# Patient Record
Sex: MALE | Race: AMERICAN INDIAN OR ALASKA NATIVE
[De-identification: names, ages, dates, MRNs, and addresses within clinical notes are randomized per-mention and may not be internally consistent; named-entity substitution may affect disease eponyms.]

---

## 2020-01-12 ENCOUNTER — HOSPITAL ENCOUNTER (OUTPATIENT)
Dept: HOSPITAL 5 - ED | Age: 25
Setting detail: OBSERVATION
LOS: 1 days | Discharge: HOME HEALTH | End: 2020-01-13
Attending: INTERNAL MEDICINE | Admitting: INTERNAL MEDICINE
Payer: COMMERCIAL

## 2020-01-12 DIAGNOSIS — R55: ICD-10-CM

## 2020-01-12 DIAGNOSIS — G40.909: ICD-10-CM

## 2020-01-12 DIAGNOSIS — I10: ICD-10-CM

## 2020-01-12 DIAGNOSIS — G43.909: ICD-10-CM

## 2020-01-12 DIAGNOSIS — I63.9: Primary | ICD-10-CM

## 2020-01-12 DIAGNOSIS — E66.01: ICD-10-CM

## 2020-01-12 DIAGNOSIS — R29.818: ICD-10-CM

## 2020-01-12 LAB
ALBUMIN SERPL-MCNC: 3.6 G/DL (ref 3.9–5)
ALT SERPL-CCNC: 30 UNITS/L (ref 7–56)
APTT BLD: 29.1 SEC. (ref 24.2–36.6)
BASOPHILS # (AUTO): 0.1 K/MM3 (ref 0–0.1)
BASOPHILS NFR BLD AUTO: 1.1 % (ref 0–1.8)
BENZODIAZEPINES SCREEN,URINE: (no result)
BILIRUB DIRECT SERPL-MCNC: < 0.2 MG/DL (ref 0–0.2)
BILIRUB UR QL STRIP: (no result)
BLOOD UR QL VISUAL: (no result)
BUN SERPL-MCNC: 9 MG/DL (ref 9–20)
BUN/CREAT SERPL: 11 %
CALCIUM SERPL-MCNC: 9.5 MG/DL (ref 8.4–10.2)
EOSINOPHIL # BLD AUTO: 0.1 K/MM3 (ref 0–0.4)
EOSINOPHIL NFR BLD AUTO: 1.1 % (ref 0–4.3)
HCT VFR BLD CALC: 42.4 % (ref 35.5–45.6)
HEMOLYSIS INDEX: 12
HGB BLD-MCNC: 14.5 GM/DL (ref 11.8–15.2)
INR PPP: 1.03 (ref 0.87–1.13)
LYMPHOCYTES # BLD AUTO: 1.6 K/MM3 (ref 1.2–5.4)
LYMPHOCYTES NFR BLD AUTO: 15.5 % (ref 13.4–35)
MCHC RBC AUTO-ENTMCNC: 34 % (ref 32–34)
MCV RBC AUTO: 82 FL (ref 84–94)
METHADONE SCREEN,URINE: (no result)
MONOCYTES # (AUTO): 0.9 K/MM3 (ref 0–0.8)
MONOCYTES % (AUTO): 8.4 % (ref 0–7.3)
MUCOUS THREADS #/AREA URNS HPF: (no result) /HPF
OPIATE SCREEN,URINE: (no result)
PH UR STRIP: 8 [PH] (ref 5–7)
PLATELET # BLD: 357 K/MM3 (ref 140–440)
PROT UR STRIP-MCNC: (no result) MG/DL
RBC # BLD AUTO: 5.2 M/MM3 (ref 3.65–5.03)
RBC #/AREA URNS HPF: 0 /HPF (ref 0–6)
UROBILINOGEN UR-MCNC: < 2 MG/DL (ref ?–2)
WBC #/AREA URNS HPF: 1 /HPF (ref 0–6)

## 2020-01-12 PROCEDURE — 85670 THROMBIN TIME PLASMA: CPT

## 2020-01-12 PROCEDURE — 70450 CT HEAD/BRAIN W/O DYE: CPT

## 2020-01-12 PROCEDURE — A9540 TC99M MAA: HCPCS

## 2020-01-12 PROCEDURE — 93880 EXTRACRANIAL BILAT STUDY: CPT

## 2020-01-12 PROCEDURE — 85610 PROTHROMBIN TIME: CPT

## 2020-01-12 PROCEDURE — A9558 XE133 XENON 10MCI: HCPCS

## 2020-01-12 PROCEDURE — 80307 DRUG TEST PRSMV CHEM ANLYZR: CPT

## 2020-01-12 PROCEDURE — 36415 COLL VENOUS BLD VENIPUNCTURE: CPT

## 2020-01-12 PROCEDURE — G0378 HOSPITAL OBSERVATION PER HR: HCPCS

## 2020-01-12 PROCEDURE — 85730 THROMBOPLASTIN TIME PARTIAL: CPT

## 2020-01-12 PROCEDURE — 78582 LUNG VENTILAT&PERFUS IMAGING: CPT

## 2020-01-12 PROCEDURE — 83880 ASSAY OF NATRIURETIC PEPTIDE: CPT

## 2020-01-12 PROCEDURE — 93306 TTE W/DOPPLER COMPLETE: CPT

## 2020-01-12 PROCEDURE — 85025 COMPLETE CBC W/AUTO DIFF WBC: CPT

## 2020-01-12 PROCEDURE — 82550 ASSAY OF CK (CPK): CPT

## 2020-01-12 PROCEDURE — 80076 HEPATIC FUNCTION PANEL: CPT

## 2020-01-12 PROCEDURE — 80048 BASIC METABOLIC PNL TOTAL CA: CPT

## 2020-01-12 PROCEDURE — 85379 FIBRIN DEGRADATION QUANT: CPT

## 2020-01-12 PROCEDURE — 93005 ELECTROCARDIOGRAM TRACING: CPT

## 2020-01-12 PROCEDURE — 96372 THER/PROPH/DIAG INJ SC/IM: CPT

## 2020-01-12 PROCEDURE — 93010 ELECTROCARDIOGRAM REPORT: CPT

## 2020-01-12 PROCEDURE — 84484 ASSAY OF TROPONIN QUANT: CPT

## 2020-01-12 PROCEDURE — 71045 X-RAY EXAM CHEST 1 VIEW: CPT

## 2020-01-12 PROCEDURE — 81001 URINALYSIS AUTO W/SCOPE: CPT

## 2020-01-12 PROCEDURE — 96374 THER/PROPH/DIAG INJ IV PUSH: CPT

## 2020-01-12 PROCEDURE — 82962 GLUCOSE BLOOD TEST: CPT

## 2020-01-12 PROCEDURE — 80061 LIPID PANEL: CPT

## 2020-01-12 PROCEDURE — 97162 PT EVAL MOD COMPLEX 30 MIN: CPT

## 2020-01-12 PROCEDURE — 97110 THERAPEUTIC EXERCISES: CPT

## 2020-01-12 PROCEDURE — 97530 THERAPEUTIC ACTIVITIES: CPT

## 2020-01-12 PROCEDURE — 99291 CRITICAL CARE FIRST HOUR: CPT

## 2020-01-12 PROCEDURE — 82553 CREATINE MB FRACTION: CPT

## 2020-01-12 RX ADMIN — ASPIRIN SCH: 325 TABLET ORAL at 15:42

## 2020-01-12 RX ADMIN — HEPARIN SODIUM SCH UNIT: 5000 INJECTION, SOLUTION INTRAVENOUS; SUBCUTANEOUS at 22:04

## 2020-01-12 NOTE — PROGRESS NOTE
Subjective


Date of service: 01/12/20


Interval history: 


seen for stroke alert  23 yo BM seen for AMS and speech slurring  seen in CT and

checjked the CT which was grossly normal advise the RN fro ED of CT   remainder 

of the w/u pending  no family here to speak to   will follow up CT hard too 

assess because the CT table would not move since patient's weight exheeded limit

of table  but I am satisfied rthat images reviewed show no bleed or acute 

perocess








Objective





- Vital Sign


                               Vital Signs - 12hr











  01/12/20 01/12/20





  11:59 13:14


 


Temperature 97.6 F 


 


Pulse Rate 96 H 74


 


Respiratory 18 18





Rate  


 


Blood Pressure 174/105 


 


Blood Pressure  140/80





[Left]  


 


O2 Sat by Pulse 99 100





Oximetry

## 2020-01-12 NOTE — PROGRESS NOTE
Subjective


Date of service: 01/12/20


Interval history: 


see dictated note from stroke alert








Objective





- Vital Sign


                               Vital Signs - 12hr











  01/12/20 01/12/20 01/12/20





  11:59 13:00 13:14


 


Temperature 97.6 F  


 


Pulse Rate 96 H  74


 


Respiratory 18  18





Rate   


 


Blood Pressure 174/105  


 


Blood Pressure   140/80





[Left]   


 


O2 Sat by Pulse 99 100 100





Oximetry   














  01/12/20 01/12/20 01/12/20





  14:18 16:26 17:18


 


Temperature   


 


Pulse Rate 74 74 90


 


Respiratory 22 20 





Rate   


 


Blood Pressure   169/104


 


Blood Pressure 168/98 176/113 





[Left]   


 


O2 Sat by Pulse 100 100 





Oximetry   














- Laboratory Findings


CBC and BMP: 


                                 01/12/20 13:34





                                 01/12/20 13:34


Abnormal Lab Findings: 


                                  Abnormal Labs











  01/12/20 01/12/20 01/12/20





  13:34 13:34 13:34


 


RBC  5.20 H  


 


MCV  82 L  


 


Mono % (Auto)  8.4 H  


 


Mono #  0.9 H  


 


Seg Neutrophils %  73.9 H  


 


D-Dimer   347.84 H 


 


Sodium   


 


Carbon Dioxide   


 


Total Creatine Kinase    366 H


 


Albumin   


 


Urine pH   














  01/12/20 01/12/20





  13:34 13:40


 


RBC  


 


MCV  


 


Mono % (Auto)  


 


Mono #  


 


Seg Neutrophils %  


 


D-Dimer  


 


Sodium  136 L 


 


Carbon Dioxide  21 L 


 


Total Creatine Kinase  


 


Albumin  3.6 L 


 


Urine pH   8.0 H

## 2020-01-12 NOTE — XRAY REPORT
CHEST 1 VIEW 1/12/2020 12:47 PM



INDICATION / CLINICAL INFORMATION:

HTN.



COMPARISON: 

None available.



FINDINGS:



SUPPORT DEVICES: None.



HEART / MEDIASTINUM: No significant abnormality. 



LUNGS / PLEURA: No significant pulmonary or pleural abnormality. No pneumothorax. 



ADDITIONAL FINDINGS: No significant additional findings.



IMPRESSION:

1. No acute findings.



Signer Name: Saravanan Garsia MD 

Signed: 1/12/2020 1:02 PM

 Workstation Name: [id number]

## 2020-01-12 NOTE — HISTORY AND PHYSICAL REPORT
History of Present Illness


Chief complaint: 





I do not know was going on


History of present illness: 


24-year-old male with morbid obesity, lymphedema, migraine headache, seizure 

disorder presents to ED for evaluation.  Patient is minimally cooperative with 

interview at the time of my exam.  Patient mother is at bedside during exam and 

interview and provides more expansive history.  As per the patient's motherthe 

patient has experienced left arm pain and weakness after a left arm injection 

for peripheral neuropathy as per the mother.  Patient mother reports the patient

experienced concomitant left leg weakness today while attending [employer].  Patient

reports that he does not recall any of the events that charge.  Patient mother 

reports that patient "went out" for a few minutes.  Several of the charge nurses

subsequently came over to evaluate the patient and conducted a sternal rub which

caused the patient to become more alert.  EMS was notified upon arrival the 

patient was found to be in distress.  A code stroke was called and the patient 

was [facility]  Patient seen and 

evaluated in the ED.  Tele-neurology consulted in ED.  Neurology consulted in 

ED.  Patient found not to be a candidate for TPA.  Lab and imaging studies 

reviewed.  Patient placed in observation status and initiated on stroke 

protocol.  No reports of fever, chills, chest pain, palpitations, shortness of 

breath, trauma, productive cough, skin rash, or recent ill contacts.  Prior 

admission on 4/14/2016 reviewed all medication listed at time of admission have 

been reconciled.











Past History


Past Medical History: migraines, seizures, other (See HPI)


Past Surgical History: No surgical history, Other (Reviewed)


Social history: single.  denies: smoking, alcohol abuse, prescription drug abuse


Family history: diabetes, hypertension





Medications and Allergies


                                    Allergies











Allergy/AdvReac Type Severity Reaction Status Date / Time


 


No Known Allergies Allergy   Unverified 04/14/16 12:34











                                Home Medications











 Medication  Instructions  Recorded  Confirmed  Last Taken  Type


 


No Known Home Medications [No  01/12/20 01/12/20 Unknown History





Reported Home Medications]     














Review of Systems


Constitutional: no weight loss, no weight gain, no fever, no chills


Ears, nose, mouth and throat: no ear pain, no ear discharge, no decreased hearin

g, no nose pain, no nasal congestion, no nasal discharge


Cardiovascular: no chest pain, no orthopnea, no palpitations, no rapid/irregular

heart beat, no lightheadedness, no shortness of breath


Respiratory: no cough, no cough with sputum, no excessive sputum, no hemoptysis,

no shortness of breath


Gastrointestinal: no abdominal pain, no nausea, no diarrhea, no constipation, no

hematemesis


Genitourinary Male: no hematuria, no discharge, no urinary frequency, no urinary

hesitancy


Rectal: no pain, no incontinence, no bleeding


Musculoskeletal: no neck pain, no shooting arm pain, no arm numbness/tingling, 

no shooting leg pain


Integumentary: no rash, no pruritis, no blisters


Neurological: weakness, confusion, memory loss, no tingling, no syncope, no 

change in speech, no change in mentation


Psychiatric: no anxiety, no memory loss, no insomnia, no change in libido


Endocrine: no heat intolerance, no polyphagia, no polydipsia, no polyuria


Hematologic/Lymphatic: no easy bruising, no easy bleeding, no lymphadenopathy


Allergic/Immunologic: no allergic rhinitis, no wheezing, no persistent 

infections, no anaphylaxis





Exam





- Constitutional


Vitals: 


                                        











Temp Pulse Resp BP Pulse Ox


 


 97.6 F   74   18   140/80   100 


 


 01/12/20 11:59  01/12/20 13:14  01/12/20 13:14  01/12/20 13:14  01/12/20 13:14











General appearance: Present: no acute distress, obese





- EENT


Eyes: Present: PERRL


ENT: hearing intact, clear oral mucosa





- Neck


Neck: Present: supple, normal ROM





- Respiratory


Respiratory effort: normal


Respiratory: bilateral: CTA





- Cardiovascular


Heart Sounds: Present: S1 & S2.  Absent: rub, click





- Extremities


Extremities: pulses symmetrical


Extremity abnormal: edema, other


Peripheral Pulses: within normal limits





- Abdominal


General gastrointestinal: Present: soft, non-tender, non-distended, normal bowel

sounds


Male genitourinary: Present: normal





- Integumentary


Integumentary: Present: clear, warm, dry





- Musculoskeletal


Musculoskeletal: gait normal, strength equal bilaterally





- Psychiatric


Psychiatric: appropriate mood/affect, intact judgment & insight





- Neurologic


Neurologic: CNII-XII intact, moves all extremities





Results





- Labs


CBC & Chem 7: 


                                 01/12/20 13:34





                                 01/12/20 13:34





Assessment and Plan





- Patient Problems


(1) CVA (cerebral vascular accident)


Current Visit: Yes   Status: Acute   


Qualifiers: 


   Laterality of affected vessel: unspecified 


Plan to address problem: 


CVA protocol, CT head, neurochecks, neurology consulted in ED, tele-neurology co

nsulted, lipid panel, antiplatelet therapy, supportive care, neurochecks,








(2) Morbid obesity


Current Visit: Yes   Status: Chronic   


Plan to address problem: 


Balanced diet, increase physical activity at discharge, outpatient bariatric 

surgery consult.








(3) Seizure disorder


Current Visit: Yes   Status: Acute   


Plan to address problem: 


No active seizure at time of admission, neuro check, supportive care.  Neurology

consulted.








(4) DVT prophylaxis


Current Visit: Yes   Status: Acute   


Plan to address problem: 


SCD to bilateral lower extremities while in bed, prophylactic heparin

## 2020-01-12 NOTE — EMERGENCY DEPARTMENT REPORT
ED Neuro Deficit HPI





- General


Chief Complaint: Neuro Symptoms/Deficit


Stated Complaint: SYNCOPAL EPISODE


Time Seen by Provider: 01/12/20 12:10


Source: patient, EMS


Mode of arrival: Stretcher


Limitations: Physical Limitation





- History of Present Illness


Initial Comments: 


This is a 24-year-old morbidly obese man who has presented to this hospital 2016

with similar symptoms.  He is here with his family.  They were at [facility].  He 

apparently suffered a syncopal episode while he was sitting in a chair.  He 

remained in the chair with assistance but not responding to others for 3-4 

minutes.  It is reported that his breathing was normal while he had the above 

episode.  There was no shaking movements.  Last known well time was 11:30.  He 

does have a history of seizure on Keppra.  He was diagnosed as having complex 

migraine versus conversion disorder for similar symptoms in 2016:








Hospital course: 


Patient is a 21 years old morbidly obese -American male with history of 

childhood seizures seizure disorder who presented to the hospital complaining of

severe headache associated with left-sided weakness.  He had a CT head on admiss

ion with no acute findings.  Brain MRI/A unable to be obtained due to patient's 

weight.  After 4 days another CT head was obtained and CVA was ruled out.  

Differential diagnosis complex migraine versus conversion disorder.  Patient was

started on migraine medications and his discharged with home PT.  Advised to 

follow-up also with psychiatry.








Disposition: DC/TX HOME UNDER HOME HEALTH


Time spent for discharge: 35 min





- Discharge Diagnoses


(1) Headache


Status: Acute   Qualifiers: 


     Headache type: unspecified     Headache chronicity pattern: acute headache 

   Intractability: intractable     Qualifier Code: (R51) Headache  





(2) Neurosensory deficit


Status: Acute





(3) Seizure disorder


Status: Acute





(4) Morbid obesity


Status: Chronic   





Patient states that he does not recall the episode.  I spoke to the tele-

neurologist and ready.  He told the tele-neurologist that he doesn't remember 

anything about 2016.  In fact, he does not recall being transported to the 

emergency department per conversation with the tele-neurologist.  He was 

speaking in a very low voice.  He kept his eyes closed during my encounter 

mostly.  He had apparently poor effort on NIH stroke score testing.  He was 

found to have a left hemiparesis.





Patient stated that he had chest pain since yesterday which he describes as dull

and intermittent.  He states that he had a headache since "1 minute before"I 

came in the room.  The headache was essentially global.





Patient's mother thinks this episode is related to a nerve block that he had 

near his left elbow for pain in his left hand or wrist.  I do not know if she's 

been diagnosed with RSD or some sort of peripheral neuropathy.  She also states 

that he may have some swelling in his left axillary area.  She was relatively 

unconvinced that this episode is not likely related as the patient has now left 

leg weakness as well.  She is also resisting the idea that this may be a similar

episode compared with 2016.  Reviewing the medical records, it does look 

remarkably similar.





I have now discussed the case with the tele-neurologist twice.  She recommends 

aspirin and admission for serial CT.  She does not recommend TPA.  Patient has a

prior similar episode, current headache, chest pain and an alternate diagnosis 

which is much more likely than stroke.  Therefore, TPA is not indicated.


-: Sudden


Location: left arm, left leg


Presenting Symptoms: Present: Weak/Paralyzed One Side


History of same: Yes


Place: other ([facility])


Severity: severe


Quality: weak


Improves With: none


Worsens With: none


On Anticoagulants: No


Context: sudden onset


Associated Symptoms: chest pain





- Related Data


Home Medications: 


                                  Previous Rx's











 Medication  Instructions  Recorded  Last Taken  Type


 


Butalb/Acetamin/Caff -40 2 tab PO Q4H PRN #30 tablet 04/19/16 Unknown Rx





[Fioricet -40]    


 


levETIRAcetam [Keppra TAB] 500 mg PO BID #60 tablet 04/19/16 Unknown Rx











Allergies/Adverse Reactions: 


                                    Allergies











Allergy/AdvReac Type Severity Reaction Status Date / Time


 


No Known Allergies Allergy   Unverified 04/14/16 12:34














ED Review of Systems


ROS: 


Stated complaint: SYNCOPAL EPISODE


Other details as noted in HPI





Constitutional: denies: chills, fever


Eyes: denies: eye pain, eye discharge, vision change


ENT: denies: ear pain, throat pain


Respiratory: denies: cough, shortness of breath, wheezing


Cardiovascular: chest pain.  denies: palpitations


Endocrine: no symptoms reported


Gastrointestinal: denies: abdominal pain, nausea, diarrhea


Genitourinary: denies: urgency, dysuria


Musculoskeletal: denies: back pain, joint swelling, arthralgia


Skin: denies: rash, lesions


Neurological: headache.  denies: weakness, paresthesias


Psychiatric: denies: anxiety, depression


Hematological/Lymphatic: denies: easy bleeding, easy bruising





ED Past Medical Hx





- Past Medical History


Hx Headaches / Migraines: Yes


Hx Seizures: Yes (couple years ago)





- Social History


Smoking Status: Never Smoker


Substance Use Type: None





- Medications


Home Medications: 


                                Home Medications











 Medication  Instructions  Recorded  Confirmed  Last Taken  Type


 


Butalb/Acetamin/Caff -40 2 tab PO Q4H PRN #30 tablet 04/19/16  Unknown Rx





[Fioricet -40]     


 


levETIRAcetam [Keppra TAB] 500 mg PO BID #60 tablet 04/19/16  Unknown Rx














ED Neuro Physical Exam





- General


Limitations: Physical Limitation


General appearance: lethargic (keeping eyes closed), obese (morbidly)


Suspected Stroke: Yes (very unlikely)





- Head


Head exam: Present: atraumatic, normocephalic





- Eye


Eye exam: Present: normal appearance, PERRL, EOMI.  Absent: scleral icterus





- ENT


ENT exam: Present: mucous membranes moist





- Neck


Neck exam: Present: normal inspection.  Absent: tenderness, meningismus





- Respiratory


Respiratory exam: Present: normal lung sounds bilaterally.  Absent: respiratory 

distress





- Cardiovascular


Cardiovascular Exam: Present: regular rate, normal rhythm.  Absent: systolic 

murmur, diastolic murmur, rubs, gallop





- GI/Abdominal


GI/Abdominal exam: Present: soft, normal bowel sounds.  Absent: distended, 

tenderness, guarding, rebound





- Rectal


Rectal exam: Present: deferred





- Extremities Exam


Extremities exam: Present: normal inspection





- Back Exam


Back exam: Present: normal inspection





- Neurological Exam


Neurological exam: Present: alert, oriented X3, CN II-XII intact, motor sensory 

deficit





- NIHSS


Assessment Interval: Baseline


1a. Level of Consciousness: arousable/minor stimuli


1b. LOC Questions: answers both correctly


1c. LOC Commands: performs tasks correctly


2. Best Gaze: normal


3. Visual: no visual loss


4. Facial Palsy: normal symmetrical movement


5b. Motor Arm Right: no drift


5a. Motor Arm Left: no gravity effort


6a. Motor Leg Left: no gravity effort


6b. Motor Leg Right: no drift


7. Limb Ataxia: absent


8. Sensory: normal


9. Best Language: no aphasia


10. Dysarthria: normal


11. Extinction/Inattention: no abnormality


Total Score: 7


Stroke Severity: Moderate Stroke





- Psychiatric


Psychiatric exam: Present: normal mood, flat affect





- Skin


Skin exam: Present: warm, dry, intact, normal color.  Absent: rash





ED Course


                                   Vital Signs











  01/12/20 01/12/20 01/12/20





  11:59 13:00 13:14


 


Temperature 97.6 F  


 


Pulse Rate 96 H  74


 


Respiratory 18  18





Rate   


 


Blood Pressure 174/105  


 


Blood Pressure   140/80





[Left]   


 


O2 Sat by Pulse 99 100 100





Oximetry   














- Reevaluation(s)


Reevaluation #1: 


Patient with slightly more movements of his upper and lower extremity.  There is

 seems to be an effort dependent component.  He will be admitted to the hospital

 for further care and evaluation.  He will be given aspirin as per the 

neurologist recommendation and admitted.


01/12/20 14:03








- Lab Data


Result diagrams: 


                                 01/12/20 13:34





                                 01/12/20 13:34


                                   Lab Results











  01/12/20 01/12/20 01/12/20 Range/Units





  13:34 13:34 13:34 


 


WBC  10.3    (4.5-11.0)  K/mm3


 


RBC  5.20 H    (3.65-5.03)  M/mm3


 


Hgb  14.5    (11.8-15.2)  gm/dl


 


Hct  42.4    (35.5-45.6)  %


 


MCV  82 L    (84-94)  fl


 


MCH  28    (28-32)  pg


 


MCHC  34    (32-34)  %


 


RDW  14.6    (13.2-15.2)  %


 


Plt Count  357    (140-440)  K/mm3


 


Lymph % (Auto)  15.5    (13.4-35.0)  %


 


Mono % (Auto)  8.4 H    (0.0-7.3)  %


 


Eos % (Auto)  1.1    (0.0-4.3)  %


 


Baso % (Auto)  1.1    (0.0-1.8)  %


 


Lymph #  1.6    (1.2-5.4)  K/mm3


 


Mono #  0.9 H    (0.0-0.8)  K/mm3


 


Eos #  0.1    (0.0-0.4)  K/mm3


 


Baso #  0.1    (0.0-0.1)  K/mm3


 


Seg Neutrophils %  73.9 H    (40.0-70.0)  %


 


Seg Neutrophils #  7.6    (1.8-7.7)  K/mm3


 


PT   13.6   (12.2-14.9)  Sec.


 


INR   1.03   (0.87-1.13)  


 


APTT   29.1   (24.2-36.6)  Sec.


 


Thrombin Time   16.2   (15.1-19.6)  Sec.


 


D-Dimer   347.84 H   (0-234)  ng/mlDDU


 


Total Bilirubin     (0.1-1.2)  mg/dL


 


Direct Bilirubin     (0-0.2)  mg/dL


 


AST     (5-40)  units/L


 


ALT     (7-56)  units/L


 


Alkaline Phosphatase     ()  units/L


 


Total Creatine Kinase    366 H  ()  units/L


 


CK-MB (CK-2)    2.7  (0.0-4.0)  ng/mL


 


CK-MB (CK-2) Rel Index    0.7  (0-4)  


 


Troponin T    < 0.010  (0.00-0.029)  ng/mL


 


NT-Pro-B Natriuret Pep     (0-450)  pg/mL


 


Total Protein     (6.3-8.2)  g/dL


 


Albumin     (3.9-5)  g/dL


 


Albumin/Globulin Ratio     %


 


Urine Color     (Yellow)  


 


Urine Turbidity     (Clear)  


 


Urine pH     (5.0-7.0)  


 


Ur Specific Gravity     (1.003-1.030)  


 


Urine Protein     (Negative)  mg/dL


 


Urine Glucose (UA)     (Negative)  mg/dL


 


Urine Ketones     (Negative)  mg/dL


 


Urine Blood     (Negative)  


 


Urine Nitrite     (Negative)  


 


Urine Bilirubin     (Negative)  


 


Urine Urobilinogen     (<2.0)  mg/dL


 


Ur Leukocyte Esterase     (Negative)  


 


Urine WBC (Auto)     (0.0-6.0)  /HPF


 


Urine RBC (Auto)     (0.0-6.0)  /HPF


 


U Epithel Cells (Auto)     (0-13.0)  /HPF


 


Urine Mucus     /HPF


 


Urine Opiates Screen     


 


Urine Methadone Screen     


 


Ur Barbiturates Screen     


 


Ur Phencyclidine Scrn     


 


Ur Amphetamines Screen     


 


U Benzodiazepines Scrn     


 


Urine Cocaine Screen     


 


U Marijuana (THC) Screen     


 


Drugs of Abuse Note     














  01/12/20 01/12/20 01/12/20 Range/Units





  13:34 13:40 13:40 


 


WBC     (4.5-11.0)  K/mm3


 


RBC     (3.65-5.03)  M/mm3


 


Hgb     (11.8-15.2)  gm/dl


 


Hct     (35.5-45.6)  %


 


MCV     (84-94)  fl


 


MCH     (28-32)  pg


 


MCHC     (32-34)  %


 


RDW     (13.2-15.2)  %


 


Plt Count     (140-440)  K/mm3


 


Lymph % (Auto)     (13.4-35.0)  %


 


Mono % (Auto)     (0.0-7.3)  %


 


Eos % (Auto)     (0.0-4.3)  %


 


Baso % (Auto)     (0.0-1.8)  %


 


Lymph #     (1.2-5.4)  K/mm3


 


Mono #     (0.0-0.8)  K/mm3


 


Eos #     (0.0-0.4)  K/mm3


 


Baso #     (0.0-0.1)  K/mm3


 


Seg Neutrophils %     (40.0-70.0)  %


 


Seg Neutrophils #     (1.8-7.7)  K/mm3


 


PT     (12.2-14.9)  Sec.


 


INR     (0.87-1.13)  


 


APTT     (24.2-36.6)  Sec.


 


Thrombin Time     (15.1-19.6)  Sec.


 


D-Dimer     (0-234)  ng/mlDDU


 


Total Bilirubin  0.50    (0.1-1.2)  mg/dL


 


Direct Bilirubin  < 0.2    (0-0.2)  mg/dL


 


AST  20    (5-40)  units/L


 


ALT  30    (7-56)  units/L


 


Alkaline Phosphatase  79    ()  units/L


 


Total Creatine Kinase     ()  units/L


 


CK-MB (CK-2)     (0.0-4.0)  ng/mL


 


CK-MB (CK-2) Rel Index     (0-4)  


 


Troponin T     (0.00-0.029)  ng/mL


 


NT-Pro-B Natriuret Pep  26.88    (0-450)  pg/mL


 


Total Protein  7.7    (6.3-8.2)  g/dL


 


Albumin  3.6 L    (3.9-5)  g/dL


 


Albumin/Globulin Ratio  0.9    %


 


Urine Color   Yellow   (Yellow)  


 


Urine Turbidity   Clear   (Clear)  


 


Urine pH   8.0 H   (5.0-7.0)  


 


Ur Specific Gravity   1.011   (1.003-1.030)  


 


Urine Protein   <15 mg/dl   (Negative)  mg/dL


 


Urine Glucose (UA)   Neg   (Negative)  mg/dL


 


Urine Ketones   Neg   (Negative)  mg/dL


 


Urine Blood   Neg   (Negative)  


 


Urine Nitrite   Neg   (Negative)  


 


Urine Bilirubin   Neg   (Negative)  


 


Urine Urobilinogen   < 2.0   (<2.0)  mg/dL


 


Ur Leukocyte Esterase   Neg   (Negative)  


 


Urine WBC (Auto)   1.0   (0.0-6.0)  /HPF


 


Urine RBC (Auto)   0.0   (0.0-6.0)  /HPF


 


U Epithel Cells (Auto)   < 1.0   (0-13.0)  /HPF


 


Urine Mucus   Few   /HPF


 


Urine Opiates Screen    Presumptive negative  


 


Urine Methadone Screen    Presumptive negative  


 


Ur Barbiturates Screen    Presumptive negative  


 


Ur Phencyclidine Scrn    Presumptive negative  


 


Ur Amphetamines Screen    Presumptive negative  


 


U Benzodiazepines Scrn    Presumptive negative  


 


Urine Cocaine Screen    Presumptive negative  


 


U Marijuana (THC) Screen    Presumptive negative  


 


Drugs of Abuse Note    Disclamer  











                         Laboratory Results - last 24 hr











  01/12/20 01/12/20 01/12/20





  13:34 13:34 13:34


 


WBC  10.3  


 


RBC  5.20 H  


 


Hgb  14.5  


 


Hct  42.4  


 


MCV  82 L  


 


MCH  28  


 


MCHC  34  


 


RDW  14.6  


 


Plt Count  357  


 


Lymph % (Auto)  15.5  


 


Mono % (Auto)  8.4 H  


 


Eos % (Auto)  1.1  


 


Baso % (Auto)  1.1  


 


Lymph #  1.6  


 


Mono #  0.9 H  


 


Eos #  0.1  


 


Baso #  0.1  


 


Seg Neutrophils %  73.9 H  


 


Seg Neutrophils #  7.6  


 


PT   13.6 


 


INR   1.03 


 


APTT   29.1 


 


Thrombin Time   16.2 


 


D-Dimer   347.84 H 


 


Total Bilirubin   


 


Direct Bilirubin   


 


AST   


 


ALT   


 


Alkaline Phosphatase   


 


Total Creatine Kinase    366 H


 


CK-MB (CK-2)    2.7


 


CK-MB (CK-2) Rel Index    0.7


 


Troponin T    < 0.010


 


NT-Pro-B Natriuret Pep   


 


Total Protein   


 


Albumin   


 


Albumin/Globulin Ratio   


 


Urine Color   


 


Urine Turbidity   


 


Urine pH   


 


Ur Specific Gravity   


 


Urine Protein   


 


Urine Glucose (UA)   


 


Urine Ketones   


 


Urine Blood   


 


Urine Nitrite   


 


Urine Bilirubin   


 


Urine Urobilinogen   


 


Ur Leukocyte Esterase   


 


Urine WBC (Auto)   


 


Urine RBC (Auto)   


 


U Epithel Cells (Auto)   


 


Urine Mucus   


 


Urine Opiates Screen   


 


Urine Methadone Screen   


 


Ur Barbiturates Screen   


 


Ur Phencyclidine Scrn   


 


Ur Amphetamines Screen   


 


U Benzodiazepines Scrn   


 


Urine Cocaine Screen   


 


U Marijuana (THC) Screen   


 


Drugs of Abuse Note   














  01/12/20 01/12/20 01/12/20





  13:34 13:40 13:40


 


WBC   


 


RBC   


 


Hgb   


 


Hct   


 


MCV   


 


MCH   


 


MCHC   


 


RDW   


 


Plt Count   


 


Lymph % (Auto)   


 


Mono % (Auto)   


 


Eos % (Auto)   


 


Baso % (Auto)   


 


Lymph #   


 


Mono #   


 


Eos #   


 


Baso #   


 


Seg Neutrophils %   


 


Seg Neutrophils #   


 


PT   


 


INR   


 


APTT   


 


Thrombin Time   


 


D-Dimer   


 


Total Bilirubin  0.50  


 


Direct Bilirubin  < 0.2  


 


AST  20  


 


ALT  30  


 


Alkaline Phosphatase  79  


 


Total Creatine Kinase   


 


CK-MB (CK-2)   


 


CK-MB (CK-2) Rel Index   


 


Troponin T   


 


NT-Pro-B Natriuret Pep  26.88  


 


Total Protein  7.7  


 


Albumin  3.6 L  


 


Albumin/Globulin Ratio  0.9  


 


Urine Color   Yellow 


 


Urine Turbidity   Clear 


 


Urine pH   8.0 H 


 


Ur Specific [place]   1.011 


 


Urine Protein   <15 mg/dl 


 


Urine Glucose (UA)   Neg 


 


Urine Ketones   Neg 


 


Urine Blood   Neg 


 


Urine Nitrite   Neg 


 


Urine Bilirubin   Neg 


 


Urine Urobilinogen   < 2.0 


 


Ur Leukocyte Esterase   Neg 


 


Urine WBC (Auto)   1.0 


 


Urine RBC (Auto)   0.0 


 


U Epithel Cells (Auto)   < 1.0 


 


Urine Mucus   Few 


 


Urine Opiates Screen    Presumptive negative


 


Urine Methadone Screen    Presumptive negative


 


Ur Barbiturates Screen    Presumptive negative


 


Ur Phencyclidine Scrn    Presumptive negative


 


Ur Amphetamines Screen    Presumptive negative


 


U Benzodiazepines Scrn    Presumptive negative


 


Urine Cocaine Screen    Presumptive negative


 


U Marijuana (THC) Screen    Presumptive negative


 


Drugs of Abuse Note    Disclamer














- EKG Data


-: EKG Interpreted by Me


EKG shows normal: sinus rhythm, axis, intervals, QRS complexes, ST-T waves


Interpretation: nonspecific ST-T wave surjit





- Radiology Data


Radiology results: image reviewed


CT head no abnormal findings, discussed with radiologist.  Chest x-ray normal 

mediastinum and no acute findings.


Critical Care Time: Yes


Critical care time in (mins) excluding proc time.: 45


Critical care attestation.: 


If time is entered above; I have spent that time in minutes in the direct care 

of this critically ill patient, excluding procedure time.








ED Disposition


Clinical Impression: 


 Focal neurological deficit, Morbid obesity, Seizure disorder





Headache


Qualifiers:


 Headache type: unspecified Headache chronicity pattern: acute headache 

Intractability: not intractable Qualified Code(s): R51 - Headache





Chest pain


Qualifiers:


 Chest pain type: unspecified Qualified Code(s): R07.9 - Chest pain, unspecified





Syncope


Qualifiers:


 Syncope type: unspecified Qualified Code(s): R55 - Syncope and collapse





Hypertension


Qualifiers:


 Hypertension type: essential hypertension Qualified Code(s): I10 - Essential 

(primary) hypertension





Disposition: DC-09 OP ADMIT IP TO THIS HOSP


Is pt being admited?: Yes


Does the pt Need Aspirin: Yes


Condition: Stable


Instructions:  Chest Pain (ED), Syncope (ED), Hypertension (ED)


Time of Disposition: 14:12

## 2020-01-12 NOTE — PROGRESS NOTE
Subjective


Date of service: 01/12/20


Interval history: 


radiologist and I agree as to CT reading  suspect he is too large to get MRI   

we shall see .








Objective





- Vital Sign


                               Vital Signs - 12hr











  01/12/20 01/12/20





  11:59 13:14


 


Temperature 97.6 F 


 


Pulse Rate 96 H 74


 


Respiratory 18 18





Rate  


 


Blood Pressure 174/105 


 


Blood Pressure  140/80





[Left]  


 


O2 Sat by Pulse 99 100





Oximetry

## 2020-01-12 NOTE — CAT SCAN REPORT
CT head/brain wo con



INDICATION / CLINICAL INFORMATION:

24 years Male; MAIN: CODE Stroke  RT SIDE WEAKNESS  ASPHASIC  PH#[id number]   [facility] PT  TABLE ISSUES 
ON FIRST SCAN .



TECHNIQUE: Routine CT head without contrast. All CT scans at this location are performed using CT dos
e reduction for ALARA by means of automated exposure control.



COMPARISON: 

4/18/2016



FINDINGS:



BRAIN / INTRACRANIAL CONTENTS: No acute hemorrhage, mass effect, midline shift, hydrocephalus, or acu
te, large territorial infarct. No chronic infarct or atrophy appreciated. No significant white matter
 abnormality.



CRANIOCERVICAL JUNCTION: No significant abnormality.



ORBITS: No significant abnormality of visualized orbits.

SINUSES / MASTOIDS: No significant abnormality the visualized paranasal sinuses or mastoid air cells.




ADDITIONAL FINDINGS: None. 



IMPRESSION:

1. No focal mass, hemorrhage, hydrocephalus, or acute, large territorial infarct.



This exam was performed as part of a code stroke protocol. The exam was completed on 1/12/2020 11:45 
AM. The exam was reviewed at 11:54 AM and Dr. Marrero was notified at 11:58 AM.



Signer Name: Cory Christie MD, III 

Signed: 1/12/2020 12:59 PM

 Workstation Name: [id number]

## 2020-01-12 NOTE — CONSULTATION
NEUROLOGY NOTE



HISTORY OF PRESENT ILLNESS:  This is a 24-year-old black male that presents to

the Emergency Room with new onset of right-sided weakness, difficulty with

speech.  He was found to be obtunded and very confused, was brought to the CT

scan room on a code stroke.  CT scan was done.  I assessed the results, which

were essentially unremarkable, although they were somewhat limited by the fact

that there is some movement type artifact and there is also difficulty with

moving there was no difficulty moving the table because of his weight being 370

and we had difficulty getting the parameters of the CT scan done, but I was

satisfied the CT scan did not show any acute lesions.  I examined the patient,

he was not speaking.  It was difficult to say he was aphasic or simply obtunded.

 He is a very large black male, weight about 380.  He moves all extremities

well, but had a great deal of difficulty moving from the table to the stretcher

and from the table back to the stretcher, but I did not demonstrate any focal

weakness.  His cranial nerves appear to be intact, although his speech was very

limited.  He has altered mental state.  I do not have much of a history since

family is not available.  There were no EMS to contact.  I spoke for a long

period with his nurse from the ED.  No additional history could be provided,

which was a relevant to the current assessment I am making.  The patient be

return back to the Emergency Room for completion of ED workup and further

assessment of labs.  At this point limited observations or that patient has an

encephalopathic state without evidence of any meaningful large lesion in the

brain, bleed, edema, stroke or any other evidence of craniocervical trauma.  I

also did not observe any seizures while the time the patient was in the CT scan

room.  I doubt that the patient is postictal.  I did not see that he had bitten

his tongue.  There was no blood appearing around his mouth or facial area, does

not have any facial abrasions or contusions that were suggestive cranial trauma.

 Recommend further workup.  At this point, the patient was returned to the ED

for more complete assessment and determination of the care algorithm.





DD: 01/12/2020 13:31

DT: 01/12/2020 19:48

JOB# [id number]  [id number]

MIREYA/AMINAH

## 2020-01-13 VITALS — DIASTOLIC BLOOD PRESSURE: 78 MMHG | SYSTOLIC BLOOD PRESSURE: 153 MMHG

## 2020-01-13 LAB — HDLC SERPL-MCNC: 35 MG/DL (ref 40–59)

## 2020-01-13 RX ADMIN — HEPARIN SODIUM SCH UNIT: 5000 INJECTION, SOLUTION INTRAVENOUS; SUBCUTANEOUS at 21:06

## 2020-01-13 RX ADMIN — HEPARIN SODIUM SCH UNIT: 5000 INJECTION, SOLUTION INTRAVENOUS; SUBCUTANEOUS at 11:42

## 2020-01-13 RX ADMIN — ASPIRIN SCH MG: 325 TABLET ORAL at 11:42

## 2020-01-13 NOTE — NUCLEAR MEDICINE REPORT
NUCLEAR MEDICINE VENTILATION/PERFUSION LUNG SCAN



INDICATION / CLINICAL INFORMATION:

dypsnea.



TECHNIQUE:

24.0 mCi of Xe-133 were given by inhalation. 

5.6 mCi of Tc-99m MAA were given by IV.



COMPARISON:

Chest radiograph dated 1/12/2020.



FINDINGS:

VENTILATION: No significant ventilation defects.



PERFUSION: No significant perfusion defects.



ADDITIONAL FINDINGS: None.



IMPRESSION:

 Low probability for pulmonary embolism.



Signer Name: Minh Owens Jr, MD 

Signed: 1/13/2020 11:04 AM

 Workstation Name: [id number]

## 2020-01-13 NOTE — VASCULAR LAB REPORT
BILATERAL CAROTID DOPPLER ULTRASOUND



INDICATION : stroke



TECHNIQUE:  Grayscale and color Doppler imaging performed through the neck.



COMPARISON:  None



FINDINGS:



Right:   There is no significant atherosclerotic disease. Peak systolic velocity in the CCA is 81 cm/
s with end-diastolic velocity of 20 cm/s. Peak systolic velocity in the proximal ICA is 70 cm/s with 
end-diastolic velocity of 32 cm/s. ICA to CCA ratio is less than 2. There is antegrade  flow in the E
CA and the vertebral artery. 



Left: There is no significant atherosclerotic disease. Peak systolic velocity in the CCA is 75 cm/s w
ith end-diastolic velocity of 16 cm/s. Peak systolic velocity in the proximal ICA is 68 cm/s with end
-diastolic velocity of 30 cm/s. ICA to CCA ratio is less than 2. There is antegrade  flow in the ECA 
and the vertebral artery. 



IMPRESSION: No hemodynamically significant stenosis by NASCET criteria.



Signer Name: Minh Owens Jr, MD 

Signed: 1/13/2020 1:12 PM

 Workstation Name: [id number]

## 2020-01-13 NOTE — DISCHARGE SUMMARY
Providers





- Providers


Date of Admission: 


01/12/20 13:32





Attending physician: 


SHAREE MENDEZ





                                        





01/12/20 13:32


Occupational Therapy Evaluate and Treat [CONS] Routine 


   Comment: 


   Reason For Exam: Neuro deficits


Physical Therapy Evaluation and Treat [CONS] Routine 


   Comment: 


   Reason For Exam: Neuro deficits





01/12/20 14:03


Consult to Physician [CONS] Routine 


   Comment: 


   Consulting Provider: LUCIE WHITE


   Physician Instructions: 


   Reason For Exam: cva











Primary care physician: 


[facility], [place]








Hospitalization


Condition: Stable





- Discharge Diagnoses


(1) CVA (cerebral vascular accident)


Status: Acute   


Qualifiers: 


   Laterality of affected vessel: unspecified 





(2) Morbid obesity


Status: Chronic   





(3) Seizure disorder


Status: Acute   





(4) DVT prophylaxis


Status: Acute   





Exam





- Constitutional


Vitals: 


                                        











Temp Pulse Resp BP Pulse Ox


 


 98.1 F   80   22   150/85   98 


 


 01/13/20 12:29  01/13/20 12:29  01/13/20 12:29  01/13/20 12:29  01/13/20 12:29














Plan


Weight Bearing Status: Weight Bear as Tolerated


Diet: low fat, low cholesterol, low salt


Special Instructions: record daily weights, record daily BP diary, physical 

therapy, home health RN


[facility] with: 


[facility] [[facility] Care Provider[facility] - 7 Days


Prescriptions: 


amLODIPine 5 mg PO DAILY #30 tab


Aspirin [Aspirin BABY CHEW TAB] 81 mg PO QDAY #30 tab.chew


Simvastatin 20 mg PO QHS #30 tablet


Other Discharge Orders: 


Home Health Care (Amb) Location: None Selected


Physicial Therapy (Amb) Location: None Selected

## 2020-01-13 NOTE — PROGRESS NOTE
Subjective


Date of service: 01/13/20


Interval history: 


see my admission note  complicated case  await MRI if can be done  CT was benign

to my review








Objective





- Vital Sign


                               Vital Signs - 12hr











  01/12/20 01/13/20





  23:02 03:44


 


Temperature 98.9 F 98.0 F


 


Pulse Rate 86 79


 


Respiratory 18 18





Rate  


 


Blood Pressure 155/91 138/65


 


O2 Sat by Pulse 96 95





Oximetry  














- Laboratory Findings


CBC and BMP: 


                                 01/12/20 13:34





                                 01/12/20 13:34


Abnormal Lab Findings: 


                                  Abnormal Labs











  01/12/20 01/12/20 01/12/20





  13:34 13:34 13:34


 


RBC  5.20 H  


 


MCV  82 L  


 


Mono % (Auto)  8.4 H  


 


Mono #  0.9 H  


 


Seg Neutrophils %  73.9 H  


 


D-Dimer   347.84 H 


 


Sodium   


 


Carbon Dioxide   


 


POC Glucose   


 


Total Creatine Kinase    366 H


 


Albumin   


 


Urine pH   














  01/12/20 01/12/20 01/12/20





  13:34 13:40 21:15


 


RBC   


 


MCV   


 


Mono % (Auto)   


 


Mono #   


 


Seg Neutrophils %   


 


D-Dimer   


 


Sodium  136 L  


 


Carbon Dioxide  21 L  


 


POC Glucose    66 L


 


Total Creatine Kinase   


 


Albumin  3.6 L  


 


Urine pH   8.0 H 














  01/13/20





  01:24


 


RBC 


 


MCV 


 


Mono % (Auto) 


 


Mono # 


 


Seg Neutrophils % 


 


D-Dimer 


 


Sodium 


 


Carbon Dioxide 


 


POC Glucose  67 L


 


Total Creatine Kinase 


 


Albumin 


 


Urine pH

## 2022-01-25 NOTE — EMERGENCY DEPARTMENT REPORT
ED Neuro Deficit HPI





- General


Chief Complaint: Neuro Symptoms/Deficit


Stated Complaint: SYNCOPAL EPISODE


Time Seen by Provider: 01/12/20 12:10


Source: patient, EMS


Mode of arrival: Stretcher


Limitations: Physical Limitation





- History of Present Illness


Initial Comments: 





[facility]


The patient was informed the neurology consult would happen via TeleHealth by 

way of interactive audio and visual telecommunications and consented to 

receiving care in this manner 


DATE: January 12, 2020





Impression:


The patient has global weakness and lethargy confusion feels weaker on the left.

 Has had a prior spell that similar although he recalls none of this.  The 

patient would be highly atypical for stroke.  For this reason do not recommend 

acute thrombolytic therapy.  Patient it sounds like is amnestic for a period of 

time differential includes complicated migraine, seizure, stroke much less 

likely given his history


Recommendations:


If no contraindication can start aspirin can check f/u ct head since [place] 

cannot get MRI brain due to his weight  Without contrast at this is negative 

straight headache with non-vasoactive headache medications and hopefully 

symptoms will improve





---------------------------------------------------------------------


CC: Weakness syncope





History of Present Illness:


He said he remembers taking a shower at 8 in the am and then nothiing until 

arriving to the hospital.  Deneis health issues, takes no meds.  Had an episode 

of HA with left sided weakenss diagned with complicated migraine.  Says he had a

HA that started.  In 2016 he was admitted with the same symptoms although his 

body habitus was too large to obtain MRI of the brain he did have a follow-up CT

of the head which did not show any acute changes or evolution of stroke.  The 

diagnosis was complicated migraine versus conversion disorder.  He says hx 

childhood seizures but he cannot give any details regarding this


  


Diagnostic Testing: CT head without contrast no acute changes





Vital Signs:140/85 afebrile.





Exam:


Mental Status:


Lethargic poorly attentive





Speech mumbled but not slurred 


Cranial Nerves:


Pupils: Equal round and reactive to light


Extraocular movements: Intact in all cardinal gaze


Ptosis: Absent


Visual fields: Intact to finger counting


Facial sensation: Intact to pin and light touch


Facial movements: Intact and symmetric


Motor Exam:


The patient seems weak all over the right leg he can hold up at least against 

antigravity right upper extremity no drift, left upper extremity initially no 

movement but the arm is held up for him he can keep it up for 10 seconds, the 

left leg he will lift up off the bed


Sensory Exam: Sensation intact to deep pain





Coordination:


No ataxia








Medical Decision Making:


- Extensive number of diagnosis or management options are considered above.


- Extensive amount of complex data reviewed.


- High risk of complication and/or morbidity or mortality are associated with 

differential diagnostic


considerations above. 


- There may be uncertain outcome and increased probability of prolonged 

functional impairment or high


probability of severe prolonged functional impairment associated with some of 

these differential


diagnosis.





Medical Data Reviewed:


1.Data reviewed include clinical labs, radiology, Medical Tests;


2.Tests results discussed w/performing or interpreting physician;


3.Obtaining/reviewing old medical records;


4.Obtaining case history from another source;


5.Independent review of image, tracing or specimen.


Patient was informed the Neurology Consult would happen via telehealth (remote 

video) and consented to


receiving care in this manner. 





- Related Data


Home Medications: 


                                  Previous Rx's











 Medication  Instructions  Recorded  Last Taken  Type


 


Butalb/Acetamin/Caff -40 2 tab PO Q4H PRN #30 tablet 04/19/16 Unknown Rx





[Fioricet -40]    


 


levETIRAcetam [Keppra TAB] 500 mg PO BID #60 tablet 04/19/16 Unknown Rx











Allergies/Adverse Reactions: 


                                    Allergies











Allergy/AdvReac Type Severity Reaction Status Date / Time


 


No Known Allergies Allergy   Unverified 04/14/16 12:34














ED Review of Systems


ROS: 


Stated complaint: SYNCOPAL EPISODE


Other details as noted in HPI








ED Past Medical Hx





- Past Medical History


Hx Headaches / Migraines: Yes


Hx Seizures: Yes (couple years ago)





- Social History


Smoking Status: Never Smoker


Substance Use Type: None





- Medications


Home Medications: 


                                Home Medications











 Medication  Instructions  Recorded  Confirmed  Last Taken  Type


 


Butalb/Acetamin/Caff -40 2 tab PO Q4H PRN #30 tablet 04/19/16  Unknown Rx





[Fioricet -40]     


 


levETIRAcetam [Keppra TAB] 500 mg PO BID #60 tablet 04/19/16  Unknown Rx














ED Neuro Physical Exam





- General


Limitations: Physical Limitation


Suspected Stroke: No





ED Course





                                   Vital Signs











  01/12/20 01/12/20





  11:59 13:14


 


Temperature 97.6 F 


 


Pulse Rate 96 H 74


 


Respiratory 18 18





Rate  


 


Blood Pressure 174/105 


 


Blood Pressure  140/80





[Left]  


 


O2 Sat by Pulse 99 100





Oximetry  











Critical care attestation.: 


If time is entered above; I have spent that time in minutes in the direct care 

of this critically ill patient, excluding procedure time.








ED Disposition


Clinical Impression: 


Headache


Qualifiers:


 Headache type: unspecified Headache chronicity pattern: acute headache 

Intractability: not intractable Qualified Code(s): R51 - Headache





Disposition: DC-09 OP ADMIT IP TO THIS HOSP


Is pt being admited?: Yes


Condition: Stable No